# Patient Record
Sex: MALE | Race: WHITE | HISPANIC OR LATINO | Employment: OTHER | URBAN - METROPOLITAN AREA
[De-identification: names, ages, dates, MRNs, and addresses within clinical notes are randomized per-mention and may not be internally consistent; named-entity substitution may affect disease eponyms.]

---

## 2018-07-18 ENCOUNTER — OFFICE VISIT (OUTPATIENT)
Dept: NEUROSURGERY | Facility: CLINIC | Age: 78
End: 2018-07-18
Payer: MEDICARE

## 2018-07-18 VITALS
HEART RATE: 104 BPM | SYSTOLIC BLOOD PRESSURE: 155 MMHG | DIASTOLIC BLOOD PRESSURE: 73 MMHG | WEIGHT: 157.38 LBS | TEMPERATURE: 99 F

## 2018-07-18 DIAGNOSIS — M54.50 ACUTE MIDLINE LOW BACK PAIN WITHOUT SCIATICA: ICD-10-CM

## 2018-07-18 DIAGNOSIS — M48.54XA COMPRESSION FRACTURE OF THORACIC SPINE, NON-TRAUMATIC, INITIAL ENCOUNTER: Primary | ICD-10-CM

## 2018-07-18 DIAGNOSIS — C90.00 MULTIPLE MYELOMA NOT HAVING ACHIEVED REMISSION: ICD-10-CM

## 2018-07-18 DIAGNOSIS — M51.37 DDD (DEGENERATIVE DISC DISEASE), LUMBOSACRAL: ICD-10-CM

## 2018-07-18 DIAGNOSIS — S32.040D COMPRESSION FRACTURE OF FOURTH LUMBAR VERTEBRA WITH ROUTINE HEALING: ICD-10-CM

## 2018-07-18 DIAGNOSIS — M85.80 OSTEOPENIA DETERMINED BY X-RAY: ICD-10-CM

## 2018-07-18 DIAGNOSIS — M47.816 SPONDYLOSIS OF LUMBAR REGION WITHOUT MYELOPATHY OR RADICULOPATHY: ICD-10-CM

## 2018-07-18 DIAGNOSIS — R26.9 ABNORMAL GAIT: ICD-10-CM

## 2018-07-18 DIAGNOSIS — M51.35 DDD (DEGENERATIVE DISC DISEASE), THORACOLUMBAR: ICD-10-CM

## 2018-07-18 PROCEDURE — 99205 OFFICE O/P NEW HI 60 MIN: CPT | Mod: S$PBB,,, | Performed by: PHYSICIAN ASSISTANT

## 2018-07-18 PROCEDURE — 99999 PR PBB SHADOW E&M-NEW PATIENT-LVL V: CPT | Mod: PBBFAC,,, | Performed by: PHYSICIAN ASSISTANT

## 2018-07-18 PROCEDURE — 99205 OFFICE O/P NEW HI 60 MIN: CPT | Mod: PBBFAC | Performed by: PHYSICIAN ASSISTANT

## 2018-07-18 RX ORDER — ASPIRIN 81 MG/1
81 TABLET ORAL
COMMUNITY

## 2018-07-18 RX ORDER — HYDROCODONE BITARTRATE AND ACETAMINOPHEN 5; 325 MG/1; MG/1
TABLET ORAL
COMMUNITY
Start: 2018-05-14

## 2018-07-18 RX ORDER — METFORMIN HYDROCHLORIDE 500 MG/1
TABLET ORAL
COMMUNITY
Start: 2018-05-24

## 2018-07-18 RX ORDER — TAMSULOSIN HYDROCHLORIDE 0.4 MG/1
0.8 CAPSULE ORAL
COMMUNITY
Start: 2017-10-21

## 2018-07-18 RX ORDER — PHENYLEPHRINE HCL 10 MG
1000 TABLET ORAL
COMMUNITY

## 2018-07-18 RX ORDER — PRAVASTATIN SODIUM 80 MG/1
80 TABLET ORAL
COMMUNITY

## 2018-07-18 RX ORDER — WARFARIN 4 MG/1
TABLET ORAL
COMMUNITY
Start: 2018-05-14

## 2018-07-18 RX ORDER — OMEGA-3-ACID ETHYL ESTERS 1 G/1
2 CAPSULE, LIQUID FILLED ORAL
COMMUNITY

## 2018-07-18 RX ORDER — MULTIVITAMIN
TABLET ORAL
COMMUNITY

## 2018-07-18 RX ORDER — ONDANSETRON 8 MG/1
TABLET, ORALLY DISINTEGRATING ORAL
COMMUNITY
Start: 2018-03-27

## 2018-07-18 RX ORDER — DEXAMETHASONE 4 MG/1
TABLET ORAL
COMMUNITY
Start: 2018-06-28

## 2018-07-18 RX ORDER — DIGOXIN 250 MCG
TABLET ORAL
COMMUNITY
Start: 2018-04-23

## 2018-07-18 RX ORDER — CHLORPROMAZINE HYDROCHLORIDE 25 MG/1
25 TABLET, FILM COATED ORAL
COMMUNITY

## 2018-07-18 RX ORDER — OXYCODONE AND ACETAMINOPHEN 10; 325 MG/1; MG/1
TABLET ORAL
COMMUNITY
Start: 2018-06-11

## 2018-07-18 RX ORDER — ASCORBIC ACID 500 MG
500 TABLET ORAL
COMMUNITY

## 2018-07-18 RX ORDER — SENNOSIDES 8.6 MG/1
TABLET ORAL
COMMUNITY

## 2018-07-18 RX ORDER — LENALIDOMIDE 10 MG/1
CAPSULE ORAL
COMMUNITY
Start: 2018-07-12

## 2018-07-18 RX ORDER — ARM BRACE
1 EACH MISCELLANEOUS ONCE
COMMUNITY
Start: 2018-07-18 | End: 2018-07-18

## 2018-07-18 NOTE — LETTER
July 21, 2018      Rom Kline MD  [address]  [place] [address]           [facility] - Neurosurgery 7th Fl  [address]  [facility] [address]  Phone: [phone]          Patient: Chet Kline   MR Number: [id number]   YOB: 1940   Date of Visit: 7/18/2018       Dear Dr. Rom Kline:    Thank you for referring Chet Kline to me for evaluation. Attached you will find relevant portions of my assessment and plan of care.    If you have questions, please do not hesitate to call me. I look forward to following Chet Kline along with you.    Sincerely,    Kirt Harp PA-C    Enclosure  CC:  No Recipients    If you would like to receive this communication electronically, please contact [email or web address] or [phone] to request more information on [email or web address] access.    For providers and/or their staff who would like to refer a patient to [facility], please contact us through our one-stop-shop provider referral line, [facility], at [phone].    If you feel you have received this communication in error or would no longer like to receive these types of communications, please e-mail [email or web address]

## 2018-07-21 PROBLEM — M51.37 DDD (DEGENERATIVE DISC DISEASE), LUMBOSACRAL: Status: ACTIVE | Noted: 2018-07-21

## 2018-07-21 PROBLEM — S32.040D: Status: ACTIVE | Noted: 2018-07-21

## 2018-07-21 PROBLEM — M47.816 SPONDYLOSIS OF LUMBAR REGION WITHOUT MYELOPATHY OR RADICULOPATHY: Status: ACTIVE | Noted: 2018-07-21

## 2018-07-21 PROBLEM — M51.35 DDD (DEGENERATIVE DISC DISEASE), THORACOLUMBAR: Status: ACTIVE | Noted: 2018-07-21

## 2018-07-21 PROBLEM — C90.00 MULTIPLE MYELOMA NOT HAVING ACHIEVED REMISSION: Status: ACTIVE | Noted: 2018-07-21

## 2018-07-21 PROBLEM — M48.54XA COMPRESSION FRACTURE OF THORACIC SPINE, NON-TRAUMATIC, INITIAL ENCOUNTER: Status: ACTIVE | Noted: 2018-07-21

## 2018-07-21 PROBLEM — M85.80 OSTEOPENIA DETERMINED BY X-RAY: Status: ACTIVE | Noted: 2018-07-21

## 2018-07-21 PROBLEM — M54.50 ACUTE MIDLINE LOW BACK PAIN WITHOUT SCIATICA: Status: ACTIVE | Noted: 2018-07-21

## 2018-07-22 NOTE — PROGRESS NOTES
History & Physical    SUBJECTIVE:     Chief Complaint: low back pain     History of Present Illness:  Chet Kline is 78 y.o. male with history of multiple myeloma on revlid diagnosed 3/2018, previous prostate cancer 2/8/12, BPH, CHF, HTN, HLD, DM,  hypercoagulopathy on ASA and coumadin since 1989, and chronic low back pain s/p previous L4-5 spine surgery 10/17/17 who presents for neurosurgical evaluation, referred by [facility] [place] for multiple compression fracture and retropulsion. Patient and wife lives in [place] and was driven here by family friend since they don't have transportation from [place].     Patient is in LSO brace and walks with support cane. Patient states that he had severe low back and leg pain with weakness last year where he wasn't able to walk but he underwent L4-5 spine surgery with Dr. Knott, Neurosurgery in [place]. He did very well after that surgery; he was ambulatory after and all pain was relieved. His back pain returned Feb 2018. He went back to his physician for care but they no longer take Medicare so he was referred to [facility] [place], [place]. He denies any trauma/falls that invoked his back pain in Feb.  He describes pain as low midline back pain that is intermittent and intensity varies with time of day and level of physical activity. Pain is 10/10 and worst every AM when he wakes up. Pain also occurs when he is standing or walking. He has no pain when he is laying down or at rest. Currently on exam while sitting pain is 3/10. Denies any b/b incontinence, balance issues, or radicular leg pain/paresthesias/weakness.         Review of patient's allergies indicates:   Allergen Reactions    Niacin Hives     Gets hot       Current Outpatient Prescriptions   Medication Sig Dispense Refill    ascorbic acid, vitamin C, (VITAMIN C) 500 MG tablet Take 500 mg by mouth.      aspirin (ECOTRIN) 81 MG EC tablet Take 81 mg by mouth.      chlorproMAZINE (THORAZINE) 25 MG tablet Take 25  mg by mouth.      cinnamon bark 500 mg capsule Take 1,000 mg by mouth.      dexamethasone (DECADRON) 4 MG Tab       digoxin (LANOXIN) 250 mcg tablet       HYDROcodone-acetaminophen (NORCO) 5-325 mg per tablet       metFORMIN (GLUCOPHAGE) 500 MG tablet       multivitamin with folic acid 400 mcg Tab Take by mouth.      omega-3 acid ethyl esters (LOVAZA) 1 gram capsule Take 2 g by mouth.      ondansetron (ZOFRAN-ODT) 8 MG TbDL       oxyCODONE-acetaminophen (PERCOCET)  mg per tablet       pravastatin (PRAVACHOL) 80 MG tablet Take 80 mg by mouth.      REVLIMID 10 mg Cap       senna (SENOKOT) 8.6 mg tablet Take by mouth.      tamsulosin (FLOMAX) 0.4 mg Cap Take 0.8 mg by mouth.      warfarin (COUMADIN) 4 MG tablet       warfarin (COUMADIN) 4 MG tablet        No current facility-administered medications for this visit.        Past Medical History:   Diagnosis Date    Hyperlipidemia     Hypertension     Myeloma 03/2018     Past Surgical History:   Procedure Laterality Date    EYE SURGERY  2012    PROSTATE SURGERY  02/08/2012    SPINE SURGERY  10/17/2017    Lumbar fussion     Family History     None        Social History     Social History    Marital status:      Spouse name: N/A    Number of children: N/A    Years of education: N/A     Social History Main Topics    Smoking status: Never Smoker    Smokeless tobacco: Never Used    Alcohol use No    Drug use: No    Sexual activity: No     Other Topics Concern    None     Social History Narrative    None       Review of Systems:  Review of Systems  Constitutional: no fever, chills or night sweats. No changes in weight   Eyes: no visual changes   ENT: no nasal congestion or sore throat   Respiratory: no cough or shortness of breath   Cardiovascular: no chest pain or palpitations   Gastrointestinal: no nausea or vomiting   Genitourinary: no hematuria or dysuria   Integument/Breast: no rash or pruritis   Hematologic/Lymphatic: no easy  bruising or lymphadenopathy   Musculoskeletal: no arthralgias or myalgias. Positive for low back pain.   Neurological: no seizures or tremors. No weakness or paresthesia.   Behavioral/Psych: no auditory or visual hallucinations   Endocrine: no heat or cold intolerance     OBJECTIVE:     Vital Signs  Temp: 98.7 °F (37.1 °C)  Pulse: 104  BP: (!) 155/73  Pain Score:   3  Weight: 71.4 kg (157 lb 6.4 oz)  There is no height or weight on file to calculate BMI.      Physical Exam:  Neurosurgery Physical Exam  General: well developed, thin appearance, no distress.   Neurologic: Awake, alert and oriented x3. Thought content appropriate.  GCS: Motor: 6/Verbal: 5/Eyes: 4 GCS Total: 15  Cranial nerves: II-XII grossly intact. PERRLA. Face symmetric, tongue midline.   Language: no aphasia  Speech: no dysarthria   Neck: supple, without obvious masses    Sensory: intact to light touch B/L UE and LE  Motor Strength: Moves all extremities spontaneously with good tone. Full strength upper and lower extremities. No abnormal movements seen.    Strength  Deltoids Triceps Biceps Wrist Extension Wrist Flexion Hand    Upper: R 5/5 5/5 5/5 5/5 5/5 5/5    L 5/5 5/5 5/5 5/5 5/5 5/5     Iliopsoas Quadriceps Knee  Flexion Tibialis  anterior Gastro- cnemius EHL   Lower: R 5/5 5/5 5/5 5/5 5/5 5/5    L 5/5 5/5 5/5 5/5 5/5 5/5     Interossi muscle strength- intact    DTR's - 1+ and symmetric in UE and LE  Butterfield's - Negative        Lhermitte's - Negative  Spurlings-  Negative         Ankle Clonus - Negative           Babinski  - Negative     SLR - Negative   Gait - wide based with pelvic tilt and support cane use; kyphotic posture present  Unable to perform Tandem Gait   Cervical ROM - full; no pain with all ROM  Lumbar ROM - limited; mid back pain reproduced with extension > lateral/forward bending  No focal numbness or weakness  No midline or paraspinal tenderness to palpation  No difficulty transitioning from seated to standing position or  vice versa.  ENT: normal hearing with finger rub  Heart: RRR, no cyanosis, pallor, or edema.   Lungs- normal respiratory effort  Abdomen-  soft, symmetric and nontender  Skin: grossly intact in all 4 extremities without obvious rashes or lesions  Extremities: warm with no cyanosis or edema, or clubbing  Pulses: palpable distal pulses    SI Joint tenderness - Negative  Greater Trochanter Joint tenderness - Negative  Deuce's Test - Negative   Pain on Hip ROM - Negative  Tinel's - Negative   Phalen's - Negative      Diagnostic Results:  [facility] MRI L spine 6/6/18 personally reviewed with Dr. Stoner and agreed with findings.   Degenerative changes and osteopenia throughout. There is severe L3 with mild retropulsion and L4  compression fractures. There is mild compression deformity T12, L1, L2, and L5.           [facility] thoracolumabar spine xray 6/27/18 personally reviewed with Dr. Stoner and agreed with findings.   levoscoliosis of thoracolumbar spine with generalized osteopenia, Facet arthrosis with DDD. Again visualized multiple compression fractures. T12 compression fracture has significantly worsened compared to imaging 6/6/18 (above) with mild kyphosis at this level.             ASSESSMENT/PLAN:     78 y.o. male with history of multiple myeloma on revlid diagnosed 3/2018, previous prostate cancer 2/8/12, BPH, CHF, HTN, HLD, DM,  hypercoagulopathy on ASA and coumadin since 1989, and chronic low back pain s/p previous L4-5 spine surgery 10/17/17 who presents for neurosurgical evaluation, referred by [facility] [place] for multiple compression fracture and retropulsion. Patient and wife lives in [place] and was driven here by family friend since they don't have transportation from [place].     Patient states that he had severe low back and leg pain with weakness last year where he wasn't able to walk but he underwent L4-5 spine surgery in [place]. Initially, he did very well after that surgery; he was  ambulatory after and all pain was relieved. His back pain returned Feb 2018. He was diagnosed with multiple myeloma in March 2018. Patient has back pain but with no leg symptoms or b/b incontinence. His imaging does show multiple compression fractures throughout lumbar spine and progression of T12 compression fx with mild kyphosis on recent xray on 6/27/18. Imaging reviewed with patient/family and Dr. Stoner in depth. There's no neurosurgical intervention indicated at this time. Recommend maximal medical management given history of MM, patient will need endocrinology evaluation/treatment for osteopenia and bone strengthening/support with vit D and calcium. Can consider treatment with Prolia if there is no contraindications. For pain, patient can consider Kyphoplasty with IR for these compression fractures. External referrals were given for both of these since patient lives in [place] and wants to try to see doctors there first. A TLSO brace was delivered in clinic to patient. He was instructed to wear this with activity or OOB. He will return to clinic in 12 weeks with repeat thoracolumbar xrays.  Patient and family were instructed to monitor for neuro symptoms. If he has any falls/traumas, severe/worsening back pain, BLE weakness/paresthesias/paralysis, or b/b incontinence, he is to report to nearest ED. He is to continue all medical management per PCP and MM treatment per hem/onc.     I will have all images uploaded to Epic system.     Discussed with Dr. Stoner.     All questions were answered. Patient was encouraged to call clinic with any future concerns prior to follow up appt. If any worsening symptoms, patient should report to ED.     Please call with any questions.    Kirt Harp PA-C  Neurosurgery      I spent greater than 60 minutes reviewing patient records, examining, and counseling the patient with greater than 50% of the time spent with direct patient care, counseling, and coordination.          Note dictated with voice recognition software, please excuse any grammatical errors.

## 2018-11-07 ENCOUNTER — TELEPHONE (OUTPATIENT)
Dept: NEUROSURGERY | Facility: CLINIC | Age: 78
End: 2018-11-07

## 2018-11-07 NOTE — TELEPHONE ENCOUNTER
----- Message from Suresh Gonzalez sent at 11/7/2018  1:38 PM CST -----  Contact: [place] @  [phone]  Caller is requesting a return call from cely ELDRIDGE call

## 2018-11-07 NOTE — TELEPHONE ENCOUNTER
SW PTS WIFE, DISCUSSED HAVING TO CONTINUE WEARING THE TLSO BRACE. HAD [facility] DONE LOCALLY IN [place], WITH SOME BENEFIT. ADVISED TO CONTINUE WEARING THE BRACE UNTIL THE NEXT PAIN MANAGEMENT APPOINTMENT IN December IN [place]. SHE UNDERSTOOD.